# Patient Record
Sex: MALE | Race: BLACK OR AFRICAN AMERICAN | NOT HISPANIC OR LATINO | URBAN - METROPOLITAN AREA
[De-identification: names, ages, dates, MRNs, and addresses within clinical notes are randomized per-mention and may not be internally consistent; named-entity substitution may affect disease eponyms.]

---

## 2021-03-20 ENCOUNTER — EMERGENCY (EMERGENCY)
Facility: HOSPITAL | Age: 21
LOS: 1 days | Discharge: HOME | End: 2021-03-20
Attending: EMERGENCY MEDICINE | Admitting: EMERGENCY MEDICINE
Payer: SELF-PAY

## 2021-03-20 VITALS
OXYGEN SATURATION: 100 % | RESPIRATION RATE: 18 BRPM | SYSTOLIC BLOOD PRESSURE: 130 MMHG | HEART RATE: 65 BPM | TEMPERATURE: 99 F | DIASTOLIC BLOOD PRESSURE: 92 MMHG

## 2021-03-20 VITALS
HEART RATE: 70 BPM | TEMPERATURE: 99 F | SYSTOLIC BLOOD PRESSURE: 130 MMHG | RESPIRATION RATE: 18 BRPM | DIASTOLIC BLOOD PRESSURE: 83 MMHG | OXYGEN SATURATION: 100 %

## 2021-03-20 PROCEDURE — 73130 X-RAY EXAM OF HAND: CPT | Mod: 26,LT,76

## 2021-03-20 PROCEDURE — 99284 EMERGENCY DEPT VISIT MOD MDM: CPT | Mod: 57

## 2021-03-20 PROCEDURE — 73110 X-RAY EXAM OF WRIST: CPT | Mod: 26,LT

## 2021-03-20 PROCEDURE — 99053 MED SERV 10PM-8AM 24 HR FAC: CPT

## 2021-03-20 PROCEDURE — 26700 TREAT KNUCKLE DISLOCATION: CPT | Mod: 54

## 2021-03-20 RX ORDER — FENTANYL CITRATE 50 UG/ML
50 INJECTION INTRAVENOUS ONCE
Refills: 0 | Status: DISCONTINUED | OUTPATIENT
Start: 2021-03-20 | End: 2021-03-20

## 2021-03-20 RX ADMIN — FENTANYL CITRATE 50 MICROGRAM(S): 50 INJECTION INTRAVENOUS at 06:45

## 2021-03-20 NOTE — ED PROVIDER NOTE - ATTENDING CONTRIBUTION TO CARE
I performed a face-to-face evaluation of the patient and performed a history and physical examination. I agree with the history and physical examination.    L-handed, not employed. Car door slammed against L 1st carpo-metacarpal joint last night. Felt thumb dislocate. Feels numb radial aspect of thumb. No skin break. X-ray w/ dislocation at L 1st carpo-metacarpal joint. Re-located joint easily. Repeat xray shows joint in place. Dc w/ thumb spica, pain meds, Hand Surgery f/u.

## 2021-03-20 NOTE — ED PROVIDER NOTE - NS ED MD DISPO DISCHARGE CCDA
Patient/Caregiver provided printed discharge information.
No adenopathy or splenomegaly. No cervical or inguinal lymphadenopathy.

## 2021-03-20 NOTE — ED PROVIDER NOTE - NS ED ROS FT
REVIEW OF SYSTEMS:  General:  no fever, no chills  HEENT: no headache, no vision changes  Cardiac: no chest pain, no palpitations  Respiratory: no cough, no shortness of breath  MSK: +L thumb pain  All other ROS as documented in HPI  -Domo Garcia, PGY-3

## 2021-03-20 NOTE — ED PROVIDER NOTE - PHYSICAL EXAMINATION
General: WDWN, NAD  CV: Pulse rate normal  Pulm: No increased WOB  Abdomen: non-distended  MSK: L thumb with deformity at CMC joint, tender, no violation of skin. unable to ab/adduct thumb  Neuro: Sensory deficit to radial aspect of thumb.   Skin: Normal color for race and age

## 2021-03-20 NOTE — ED ADULT NURSE NOTE - NSIMPLEMENTINTERV_GEN_ALL_ED
Implemented All Universal Safety Interventions:  [place] to call system. Call bell, personal items and telephone within reach. Instruct patient to call for assistance. Room bathroom lighting operational. Non-slip footwear when patient is off stretcher. Physically safe environment: no spills, clutter or unnecessary equipment. Stretcher in lowest position, wheels locked, appropriate side rails in place.

## 2021-03-20 NOTE — ED PROVIDER NOTE - NSFOLLOWUPCLINICS_GEN_ALL_ED_FT
[facility] Orthopedic Surgery  Orthopedic Surgery  [address], 3rd & [address]th [place] [address]  Phone: [phone]  Fax:   Follow Up Time:     [facility]  Orthopedic Surgery  [address]  [place] [address]  Phone: [phone]  Fax:   Follow Up Time:     [facility]  Orthopedic Surgery  [address] [place] [address]  Phone: [phone]  Fax:   Follow Up Time:     [facility]  Orthopedics  [address] [place] [address]  Phone: [phone]  Fax: [phone]  Follow Up Time:     [facility]  Orthopedics  [address] [place] [address]  Phone: [phone]  Fax:   Follow Up Time:     [facility]  Orthopedic Surgery  [address] [place] [address]  Phone: [phone]  Fax:   Follow Up Time:

## 2021-03-20 NOTE — ED PROVIDER NOTE - OBJECTIVE STATEMENT
20M slammed L thumb in car door as it was closing. States L thumb popped out at base. Now having severe pain. States has some loss of sensation on radial aspect of thumb. Denies other injuries, no other complaints.

## 2021-03-20 NOTE — ED ADULT NURSE NOTE - OBJECTIVE STATEMENT
Pt received to TR b a/o x 3 c/o left thumb pain. Pt states a car door closed onto his hand. Pt noted to have swelling no left thumb. pt unable to move thumb but still had sensation to it. Respirations even and unlabored. Lung sounds clear with equal chest rise bilaterally. ABD is soft, non tender, non distended with normal active bowel sounds No complaints of chest pain, headache, nausea, dizziness, vomiting  SOB, fever, chills verbalized.

## 2021-03-20 NOTE — ED PROVIDER NOTE - PATIENT PORTAL LINK FT
You can access the FollowMyHealth Patient Portal offered by [facility] by registering at the following website: [email or web address]. By joining [employer]’s FollowMyHealth portal, you will also be able to view your health information using other applications (apps) compatible with our system.

## 2021-03-20 NOTE — ED PROVIDER NOTE - NSFOLLOWUPINSTRUCTIONS_ED_ALL_ED_FT
1. TAKE ALL MEDICATIONS AS DIRECTED.    2. FOR PAIN YOU CAN TAKE IBUPROFEN (MOTRIN, ADVIL) OR ACETAMINOPHEN (TYLENOL) AS NEEDED, AS DIRECTED ON PACKAGING.  3. FOLLOW UP WITH YOUR PRIMARY DOCTOR WITHIN 5 DAYS AS DIRECTED.  4. IF YOU HAD LABS OR IMAGING DONE, YOU WERE GIVEN COPIES OF ALL LABS AND/OR IMAGING RESULTS FROM YOUR ER VISIT--PLEASE TAKE THEM WITH YOU TO YOUR FOLLOW UP APPOINTMENTS.  5. IF NEEDED, CALL PATIENT ACCESS SERVICES AT [phone] TO FIND A PRIMARY CARE PHYSICIAN.  OR CALL [phone] TO MAKE AN APPOINTMENT WITH THE CLINIC.  6. RETURN TO THE ER FOR ANY WORSENING SYMPTOMS OR CONCERNS.     Please follow up with orthopedic surgery in the next 1 week.     Please return to the ER if you are having fevers, severe pain not alleviated with OTC meds, numbness or discoloration of your thumb or any other concerning symptoms.     Please keep the splint clean and dry. Return to the ER if it get significantly wet.

## 2021-03-20 NOTE — ED PROVIDER NOTE - PROGRESS NOTE DETAILS
thumb spica applied discussed follow up with ortho in one week. Sensation to radial aspect of thumb improved. ROM improved. Edgar Garcia M.D. PGY-3

## 2021-03-24 NOTE — ED PROCEDURE NOTE - CPROC ED JOINT DISLOCATION DETAIL1
Dr. Lawrence held forearm in stabilization while Dr. Garcia pushed the first metacarpal bone backward (more proximal), then laterally and then forward until "click" was heard upon re-location./The anatomic location was identified, and patient was properly positioned. Using the appropriate technique, joint reduction was performed.

## 2021-03-24 NOTE — ED PROCEDURE NOTE - CPROC ED TIME OUT STATEMENT1
“Patient's name, , procedure and correct site were confirmed during the [place] Timeout.”
“Patient's name, , procedure and correct site were confirmed during the [place] Timeout.”

## 2021-03-24 NOTE — ED PROCEDURE NOTE - CPROC ED POST PROC CARE GUIDE1
Verbal/written post procedure instructions were given to patient/caregiver./Keep the cast/splint/dressing clean and dry.
Verbal/written post procedure instructions were given to patient/caregiver./Keep the cast/splint/dressing clean and dry.

## 2021-03-24 NOTE — ED PROCEDURE NOTE - ATTENDING CONTRIBUTION TO CARE
Howard: I was present during the critical part of the procedure.
I performed a face-to-face evaluation of the patient and performed a history and physical examination. I agree with the history and physical examination.    Howard: I was present during the critical part of the procedure.

## 2021-03-24 NOTE — ED PROCEDURE NOTE - CPROC ED REDUCTION TECHNIQUE1
Dr. Lawrence held forearm in stabilization while Dr. Garcia pushed the first metacarpal bone backward (more proximal), then laterally and then forward until "click" was heard upon re-location.

## 2021-03-24 NOTE — ED PROCEDURE NOTE - NS ED PERI VASCULAR NEG
fingers/toes warm to touch/no swelling/capillary refill time < 2 seconds
fingers/toes warm to touch/no paresthesia/no swelling/no cyanosis of extremity/capillary refill time < 2 seconds
